# Patient Record
Sex: FEMALE | Race: WHITE | NOT HISPANIC OR LATINO | URBAN - METROPOLITAN AREA
[De-identification: names, ages, dates, MRNs, and addresses within clinical notes are randomized per-mention and may not be internally consistent; named-entity substitution may affect disease eponyms.]

---

## 2017-02-21 ENCOUNTER — OFFICE VISIT - RIVER FALLS (OUTPATIENT)
Dept: FAMILY MEDICINE | Facility: CLINIC | Age: 72
End: 2017-02-21

## 2017-02-21 ASSESSMENT — MIFFLIN-ST. JEOR: SCORE: 1245.19

## 2017-04-17 ENCOUNTER — OFFICE VISIT - RIVER FALLS (OUTPATIENT)
Dept: FAMILY MEDICINE | Facility: CLINIC | Age: 72
End: 2017-04-17

## 2022-02-11 VITALS
DIASTOLIC BLOOD PRESSURE: 68 MMHG | HEIGHT: 64 IN | SYSTOLIC BLOOD PRESSURE: 114 MMHG | WEIGHT: 172 LBS | HEART RATE: 68 BPM | BODY MASS INDEX: 29.37 KG/M2

## 2022-02-11 VITALS
WEIGHT: 169 LBS | SYSTOLIC BLOOD PRESSURE: 114 MMHG | HEART RATE: 70 BPM | DIASTOLIC BLOOD PRESSURE: 68 MMHG | BODY MASS INDEX: 29.01 KG/M2 | TEMPERATURE: 97.6 F

## 2022-02-16 NOTE — PROGRESS NOTES
Patient:   CEASAR HUBBARD            MRN: [id number]            FIN: [id number]               Age:   71 years     Sex:  Female     :  1945   Associated Diagnoses:   Hypertension   Author:   Roman Mendez MD      Visit Information      Date of Service: 2017 01:20 pm  Performing Location: [facility]  Encounter#: [id number]      Chief Complaint   2017 1:24 PM CST    Medication refills      History of Present Illness   This 71-year-old is here asking for a refill of her medications; specifically she asks to refill her Hydrochlorothiazide and Benazepril, which she has been on a long time for her blood pressure.  She also asks for Albuterol and Advair, which she takes for her asthma.  She reports she feels great.  She has been doing terrifically.  Her  talks about her mild dementia and that she is unaware of it.  In fact, during the history, she minimizes it saying it is really not bad.  She plans on having a full exam in the next couple of months so isn t interested at this time in doing labs.  We reviewed her recent testing and what she is due for.           Review of Systems   Constitutional:  No fever, No chills.    Eye   Ear/Nose/Mouth/Throat:  No nasal congestion, No sore throat.    Respiratory:  No shortness of breath, No cough.    Cardiovascular   Breast   Gastrointestinal:  No nausea, No vomiting, No diarrhea, No constipation.    Genitourinary:  No dysuria.    Gynecologic   Hematology/Lymphatics:  No bruising tendency, No swollen lymph glands.    Endocrine   Immunologic:  No recurrent fevers, No recurrent infections.    Musculoskeletal:  No muscle pain.    Integumentary:  No rash.    Neurologic:  No tingling, No headache.    Psychiatric            Health Status   Allergies:    Allergic Reactions (Selected)  No known allergies   Medications:  (Selected)   Prescriptions  Prescribed  Advair Diskus 100 mcg-50 mcg inhalation powder: 1 puff(s), inh, bid, Instructions: uses prn,  # 1 EA, 3 Refill(s), Type: Maintenance, Pharmacy: [id number], 1 puff(s) inh bid,Instr:uses prn  albuterol 90 mcg/inh inhalation aerosol: 2 puff(s), INH, QID, PRN: as needed for wheezing, # 1 EA, 1 Refill(s), Type: Maintenance, Pharmacy: [facility], 2 puff(s) inh qid,PRN:as needed for wheezing  benazepril 40 mg oral tablet: 1 tab(s) ( 40 mg ), PO, Daily, # 90 tab(s), 3 Refill(s), Type: Maintenance, Pharmacy: [facility], 1 tab(s) po daily  hydroCHLOROthiazide 25 mg oral tablet: 1 tab(s) ( 25 mg ), PO, Daily, # 90 tab(s), 3 Refill(s), Type: Maintenance, Pharmacy: [facility], 1 tab(s) po daily   Problem list:    All Problems (Selected)  Hypertension / 401.9 / Confirmed  Osteoporosis / 733.00 / Confirmed  Osteoarthritis of hip / 715.95 / Confirmed  Right hip  Obese / 278.00 / Probable  Female Stress Incontinence / 625.6 / Confirmed  Macular degeneration, wet / 0472170492 / Confirmed  Asthma / 415050915 / Confirmed  Menopause / 595062187 / Confirmed  Menarche / 83860275 / Confirmed  Mild dementia / 20097073 / Confirmed  Hypovitaminosis D / 61161254 / Confirmed      Histories   Past Medical History:    Active  Macular degeneration, wet (9846694145): Onset in  at 67 years.  Osteoporosis (733.00): Onset in the month of 2005 at 60 years  Menopause (453896159): Onset in  at 55 years.  Menarche (51170847): Onset in  at 11 years.  Hypertension (401.9)  Osteoarthritis of hip (715.95)  Comments:  3/14/2011 CDT 12:24 PM CDT - Suad Jean  Right hip  Asthma (259572737)  Resolved  Tobacco abuse (305.1):  Resolved.   Family History:    Alzheimer's Disease  Mother  Carcinoma  Father ()  Hypertension  Mother  Hypercalcemia  Daughter  Cancer  Father (): onset at 60 .  Comments:  2016 9:08 AM - Indira Chanel  Renal cell carcinoma.  Ovarian cancer..  Aunt (M) (): onset at 65 .  Breast Cancer  Daughter: onset at 36 .  Cousin (M): onset at 62 .  Kidney  Disease  Father ()  Comments:  2016 9:08 AM - Indira Chanel  Brightman's Disease  Colon cancer  Aunt (M) (): onset at 85 .  CML (chronic myelocytic leukemia)....  Aunt (M) ()     Procedure history:    Removal of multiple seborrheic keratoses and nevi performed by Mikel Paredes MD on 2011 at 66 Years.  Comments:  2011 9:46 AM - Radha Valencia  Left breast #2; left neck; right neck x 3; right lateral abdomen; right lateral chest; mid left back; mid right back; left shoulder; All had dermoscopic features diagnostic of seborrheic keratoses.  Tension-free Transvaginal Tape Placement withy cystoscopy performed by Reinaldo Ascencio MD on 2011 at 66 Years.  Colonoscopy (SNOMED CT 837917792) on 2011 at 66 Years.  Comments:  2011 10:58 AM - Jose Boggs MA  Normal colonoscopy repeat in 10 years  Hysterectomy (SNOMED CT 672399307) performed by Reinaldo Ascencio MD on 2011 at 66 Years.  Comments:  2011 9:47 AM - Radha Valencia  D & C  Breast lumpectomy stage one cancer.  Childbirth (SNOMED CT 4165870980).  Comments:  2016 9:13 AM - Indira Chanel  x 3  Dilation and curettage (SNOMED CT 98215838).   Social History:        Alcohol Assessment            Current, Beer (12 oz), 1 time per week or none, 5 drinks/episode maximum.      Tobacco Assessment            Former smoker, Cigarettes, Electronic Cigarettes      Substance Abuse Assessment            Never      Employment and Education Assessment            Retired      Home and Environment Assessment            3 children.  Risks in environment: Unlocked guns, Does not wear helmet.      Nutrition and Health Assessment            Type of diet: Regular.      Exercise and Physical Activity Assessment            Exercise frequency: Never.      Sexual Assessment            Sexually active: No.        Physical Examination   Vital Signs   2017 1:24 PM CST Peripheral Pulse Rate 68 bpm    Systolic Blood Pressure 114 mmHg     Diastolic Blood Pressure 68 mmHg    Mean Arterial Pressure 83 mmHg      Measurements from flowsheet : Measurements   2/21/2017 1:24 PM CST Height Measured - Standard 64 in    Weight Measured - Standard 172.0 lb    BSA 1.88 m2    Body Mass Index 29.52 kg/m2      General:  Alert and oriented, No acute distress.    Eye:  Pupils are equal, round and reactive to light, Normal conjunctiva.    HENT:  Oral mucosa is moist.    Neck:  Supple.    Respiratory:  Lungs are clear to auscultation, Respirations are non-labored.    Cardiovascular:  Normal rate, Regular rhythm, No edema.    Gastrointestinal:  Non-distended.    Musculoskeletal:  Normal gait.    Integumentary:  Warm, No rash.    Neurologic:  Alert, Oriented, Normal motor function.    Psychiatric:  Cooperative, Appropriate mood & affect, unaware of some repeat questions.       Impression and Plan   Diagnosis     Hypertension (DNX15-NV I10).     Course:  Patient with good blood pressure control on her present medications.    Her asthma is minimally symptomatic with the use of Albuterol probably only once a week.    She has other health issues that need further follow up such as her Vitamin D level, dementia, and history of hyperparathyroidism.

## 2022-02-16 NOTE — PROGRESS NOTES
Patient:   CEASAR HUBBARD            MRN: [id number]            FIN: [id number]               Age:   72 years     Sex:  Female     :  1945   Associated Diagnoses:   Arthritis of neck   Author:   Roman Mendez MD      Chief Complaint   2017 8:49 AM CDT    Neck pain. Not consistent. sx started about a year ago. Not currently in any pain      History of Present Illness   see chief complaint as noted above and confirmed with the patient     72 year old female here today with her  to discuss neck pain. The pain comes and goes, she has it everyday, when it is present it lasts about ten minutes each time, it is located at the back of her neck, she does not have pain in her shoulder's, no headache, no other pain along with the neck pain.  notices when she does have pain her balance is off  and she holds her an odd way, he feels like when she does have the neck pain she seems to be in pain. 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       Review of Systems   Constitutional:  No fever.    Ear/Nose/Mouth/Throat:  No sore throat.    Respiratory:  No cough.    Gastrointestinal:  No nausea, No vomiting, No diarrhea.    Musculoskeletal:  Neck pain.    Integumentary:  No rash.    Neurologic:  No headache.             Health Status   Allergies:    Allergic Reactions (Selected)  No known allergies   Medications:  (Selected)   Prescriptions  Prescribed  Advair Diskus 100 mcg-50 mcg inhalation powder: 1 puff(s), inh, bid, Instructions: uses prn, # 1 EA, 3 Refill(s), Type: Maintenance, Pharmacy: [facility], 1 puff(s) inh bid,Instr:uses prn  albuterol 90 mcg/inh inhalation aerosol: 2 puff(s), INH, QID, PRN: as needed for wheezing, # 1 EA, 1 Refill(s), Type: Maintenance, Pharmacy: [facility], 2 puff(s) inh qid,PRN:as needed for wheezing  benazepril 40 mg oral tablet: 1 tab(s) (  40 mg ), PO, Daily, # 90 tab(s), 3 Refill(s), Type: Maintenance, Pharmacy: [facility], 1 tab(s) po daily  hydroCHLOROthiazide 25 mg oral tablet: 1 tab(s) ( 25 mg ), PO, Daily, # 90 tab(s), 3 Refill(s), Type: Maintenance, Pharmacy: [facility], 1 tab(s) po daily   Problem list:    All Problems  Asthma / SNOMED CT 838044564 / Confirmed  Mild dementia / SNOMED CT 74689147 / Confirmed  Macular degeneration, wet / SNOMED CT 7216085747 / Confirmed  Female Stress Incontinence / ICD-9-.6 / Confirmed  Hypertension / ICD-9-.9 / Confirmed  Menarche / SNOMED CT 84465162 / Confirmed  Menopause / SNOMED CT 695101409 / Confirmed  Obese / ICD-9-.00 / Probable  Osteoarthritis of hip / ICD-9-.95 / Confirmed  Osteoporosis / ICD-9-.00 / Confirmed  Hypovitaminosis D / SNOMED CT 75077701 / Confirmed  Inactive: Breast Cancer, Stage 1 / ICD-9-.9  Inactive: Hypercalcemia / SNOMED CT 727943361  Inactive: Primary hyperparathyroidism / SNOMED CT 85651010  Resolved: Tobacco abuse / ICD-9-.1  Canceled: Asthma / ICD-9-.90  Canceled: Osteopenia / SNOMED CT 384183551      Histories   Past Medical History:    Active  Macular degeneration, wet (3619207131): Onset in  at 67 years.  Osteoporosis (733.00): Onset in the month of 2005 at 60 years  Menopause (506423105): Onset in  at 55 years.  Menarche (78707481): Onset in  at 11 years.  Hypertension (401.9)  Osteoarthritis of hip (715.95)  Comments:  3/14/2011 CDT 12:24 PM CDT - Suad Jean  Right hip  Asthma (565304123)  Resolved  Tobacco abuse (305.1):  Resolved.   Family History:    Alzheimer's Disease  Mother  Carcinoma  Father ()  Hypertension  Mother  Hypercalcemia  Daughter  Cancer  Father (): onset at 60 .  Comments:  2016 9:08 AM - Indira Chanel  Renal cell carcinoma.  Ovarian cancer..  Aunt (M) (): onset at 65 .  Breast Cancer  Daughter: onset at 36 .  Cousin (M): onset at 62  .  Kidney Disease  Father ()  Comments:  2016 9:08 AM - Indira Chanel  Brightman's Disease  Colon cancer  Aunt (M) (): onset at 85 .  CML (chronic myelocytic leukemia)....  Aunt (M) ()     Procedure history:    Removal of multiple seborrheic keratoses and nevi on 2011 at 66 Years.  Comments:  2011 9:46 AM - Radha Valencia  Left breast #2; left neck; right neck x 3; right lateral abdomen; right lateral chest; mid left back; mid right back; left shoulder; All had dermoscopic features diagnostic of seborrheic keratoses.  Tension-free Transvaginal Tape Placement withy cystoscopy on 2011 at 66 Years.  Colonoscopy (153857772) on 2011 at 66 Years.  Comments:  2011 10:58 AM - Jose Boggs MA  Normal colonoscopy repeat in 10 years  Hysterectomy (109611268) on 2011 at 66 Years.  Comments:  2011 9:47 AM - Radha Valencia  D & C  Breast lumpectomy stage one cancer.  Childbirth (1489345755).  Comments:  2016 9:13 AM - Indira Chanel  x 3  Dilation and curettage (21621598).   Social History:        Alcohol Assessment            Current, Beer (12 oz), 1 time per week or none, 5 drinks/episode maximum.      Tobacco Assessment            Former smoker, Cigarettes, Electronic Cigarettes      Substance Abuse Assessment            Never      Employment and Education Assessment            Retired      Home and Environment Assessment            3 children.  Risks in environment: Unlocked guns, Does not wear helmet.      Nutrition and Health Assessment            Type of diet: Regular.      Exercise and Physical Activity Assessment            Exercise frequency: Never.      Sexual Assessment            Sexually active: No.        Physical Examination   Vital Signs   2017 8:49 AM CDT Temperature Tympanic 97.6 DegF  LOW    Peripheral Pulse Rate 70 bpm    Pulse Site Radial artery    HR Method Manual    Systolic Blood Pressure 114 mmHg    Diastolic Blood Pressure 68 mmHg    Mean  Arterial Pressure 83 mmHg    BP Site Right arm    BP Method Manual      Measurements from flowsheet : Measurements   4/17/2017 8:49 AM CDT    Weight Measured - Standard                169 lb     General:  Alert and oriented, No acute distress.    Eye:  Pupils are equal, round and reactive to light, Normal conjunctiva.    HENT:  Oral mucosa is moist.    Neck:  Supple.    Respiratory:  Respirations are non-labored.    Cardiovascular:  Normal rate, Regular rhythm, No edema.    Gastrointestinal:  Non-distended.    Musculoskeletal:  Normal gait.    Integumentary:  Warm, No rash.    Neurologic:  Alert, Oriented.    Psychiatric:  Cooperative, Appropriate mood & affect, Normal judgment.       Review / Management   Results review      Impression and Plan       Diagnosis     Arthritis of neck (APK95-PV M46.92).     Plan:  cervical spine films shows age changes with mild arthritic changes,  radiology to overread, discussed with patients    Should take Tylenol in the morning, this will help with the pain.     It is important to move the neck around and excercise the neck.     Can use ice or heat for pain if needed.     Will refer to PT, they will show you how to do good neck excercises. .    Summary:  Discussed importance of neck excercises    Reviewed x-ray results which do show arthritis     Reffered to physical therapy to learn excercises to help with the neck.     Informed Tylenol in the morning may help with pain throughout the day .    Jessa PEREZ Medical Assistant acted solely as a scribe for, and in presence of Dr. Roman Mendez who performed the services.